# Patient Record
Sex: MALE | Race: WHITE | NOT HISPANIC OR LATINO
[De-identification: names, ages, dates, MRNs, and addresses within clinical notes are randomized per-mention and may not be internally consistent; named-entity substitution may affect disease eponyms.]

---

## 2024-09-23 ENCOUNTER — NON-APPOINTMENT (OUTPATIENT)
Age: 59
End: 2024-09-23

## 2024-09-24 ENCOUNTER — TRANSCRIPTION ENCOUNTER (OUTPATIENT)
Age: 59
End: 2024-09-24

## 2024-09-24 ENCOUNTER — APPOINTMENT (OUTPATIENT)
Dept: SURGERY | Facility: CLINIC | Age: 59
End: 2024-09-24
Payer: MEDICAID

## 2024-09-24 ENCOUNTER — NON-APPOINTMENT (OUTPATIENT)
Age: 59
End: 2024-09-24

## 2024-09-24 VITALS
HEART RATE: 79 BPM | BODY MASS INDEX: 27.3 KG/M2 | DIASTOLIC BLOOD PRESSURE: 74 MMHG | OXYGEN SATURATION: 99 % | HEIGHT: 71 IN | SYSTOLIC BLOOD PRESSURE: 118 MMHG | WEIGHT: 195 LBS

## 2024-09-24 DIAGNOSIS — Z78.9 OTHER SPECIFIED HEALTH STATUS: ICD-10-CM

## 2024-09-24 DIAGNOSIS — Z84.1 FAMILY HISTORY OF DISORDERS OF KIDNEY AND URETER: ICD-10-CM

## 2024-09-24 DIAGNOSIS — Z87.891 PERSONAL HISTORY OF NICOTINE DEPENDENCE: ICD-10-CM

## 2024-09-24 DIAGNOSIS — D17.1 BENIGN LIPOMATOUS NEOPLASM OF SKIN AND SUBCUTANEOUS TISSUE OF TRUNK: ICD-10-CM

## 2024-09-24 DIAGNOSIS — Z82.49 FAMILY HISTORY OF ISCHEMIC HEART DISEASE AND OTHER DISEASES OF THE CIRCULATORY SYSTEM: ICD-10-CM

## 2024-09-24 PROBLEM — Z00.00 ENCOUNTER FOR PREVENTIVE HEALTH EXAMINATION: Status: ACTIVE | Noted: 2024-09-24

## 2024-09-24 PROCEDURE — 99203 OFFICE O/P NEW LOW 30 MIN: CPT

## 2024-10-03 PROBLEM — D17.1 LIPOMA OF BACK: Status: ACTIVE | Noted: 2024-10-03

## 2024-10-03 NOTE — PHYSICAL EXAM
[Normal Breath Sounds] : Normal breath sounds [Alert] : alert [Oriented to Person] : oriented to person [Oriented to Place] : oriented to place [Oriented to Time] : oriented to time [Abdomen Masses] : No abdominal masses [Abdomen Tenderness] : ~T No ~M abdominal tenderness [JVD] : no jugular venous distention  [de-identified] : Awake and alert [de-identified] : 2 soft tissue masses near the left shoulder blade 1 seems greater than 3 cm in size [de-identified] :  No gross motor or sensory deficits.

## 2024-10-03 NOTE — END OF VISIT
[FreeTextEntry3] :   All medical record entries made by the scribe were at my, NOHEMI MACHADO, direction and personally dictated by me on 9/24/2024. I have reviewed the chart and agree that the record accurately reflects my personal performance of the history, physical exam, assessment, and plan. I have also personally directed, reviewed, and agreed with the chart. [Time Spent: ___ minutes] : I have spent [unfilled] minutes of time on the encounter which excludes teaching and separately reported services.

## 2024-10-03 NOTE — ASSESSMENT
[FreeTextEntry1] : 58-year-old male here for 2 soft tissue masses in the left upper back most likely consistent with lipomas.  Given some increase in size and occasional discomfort patient would like to proceed with surgery to remove them.  Discussed surgical removal of soft tissue masses.  Given the size would recommend doing this with some anesthesia in the OR.  Discussed sending tissue for pathology to confirm benign lipomas.  Reviewed risk and benefits of surgery.  Risk discussed include infection, bleeding, delayed wound healing, recurrence of lipoma.  Patient would like to schedule surgery.  Will get appropriate presurgical risk assessment and testing.  Will schedule for excision of 2 soft tissue masses on the left upper back.

## 2024-10-03 NOTE — ADDENDUM
[FreeTextEntry1] :  Documented by Isabelle Douglas acting as a scribe for NOHEMI MACHADO on 9/24/2024.

## 2024-10-03 NOTE — HISTORY OF PRESENT ILLNESS
[FreeTextEntry1] : 57 yo male presents for an evaluation for lipoma. Patient reports he has two lipomas along the left shoulder blades since 2013, but now the lipomas are becoming uncomfortable. Denies pain when laying down or at rest. Patient reports his PMD has been monitoring it and denies that the lipomas have increased in size. Patient is up to date with his colonoscopies. He presents for discussion of treatment options. Patient denies any significant medical history.  Patient [place] of [place] by early December 2024.

## 2024-10-03 NOTE — PHYSICAL EXAM
[Normal Breath Sounds] : Normal breath sounds [Alert] : alert [Oriented to Person] : oriented to person [Oriented to Place] : oriented to place [Oriented to Time] : oriented to time [Abdomen Masses] : No abdominal masses [Abdomen Tenderness] : ~T No ~M abdominal tenderness [JVD] : no jugular venous distention  [de-identified] : Awake and alert [de-identified] : 2 soft tissue masses near the left shoulder blade 1 seems greater than 3 cm in size [de-identified] :  No gross motor or sensory deficits.

## 2024-10-18 ENCOUNTER — RESULT REVIEW (OUTPATIENT)
Age: 59
End: 2024-10-18

## 2024-10-18 ENCOUNTER — APPOINTMENT (OUTPATIENT)
Dept: SURGERY | Facility: HOSPITAL | Age: 59
End: 2024-10-18

## 2024-10-30 ENCOUNTER — APPOINTMENT (OUTPATIENT)
Dept: SURGERY | Facility: CLINIC | Age: 59
End: 2024-10-30

## 2024-10-30 VITALS
WEIGHT: 180 LBS | SYSTOLIC BLOOD PRESSURE: 104 MMHG | BODY MASS INDEX: 25.2 KG/M2 | HEART RATE: 86 BPM | OXYGEN SATURATION: 95 % | DIASTOLIC BLOOD PRESSURE: 73 MMHG | HEIGHT: 71 IN

## 2024-10-30 RX ORDER — TADALAFIL 5 MG/1
5 TABLET, FILM COATED ORAL
Refills: 0 | Status: ACTIVE | COMMUNITY